# Patient Record
Sex: MALE | Race: WHITE | Employment: FULL TIME | URBAN - NONMETROPOLITAN AREA
[De-identification: names, ages, dates, MRNs, and addresses within clinical notes are randomized per-mention and may not be internally consistent; named-entity substitution may affect disease eponyms.]

---

## 2024-07-10 ENCOUNTER — HOSPITAL ENCOUNTER (EMERGENCY)
Age: 19
Discharge: HOME | End: 2024-07-10
Attending: INTERNAL MEDICINE
Payer: COMMERCIAL

## 2024-07-10 VITALS
TEMPERATURE: 98.1 F | HEIGHT: 72 IN | WEIGHT: 149 LBS | SYSTOLIC BLOOD PRESSURE: 121 MMHG | DIASTOLIC BLOOD PRESSURE: 60 MMHG | HEART RATE: 60 BPM | RESPIRATION RATE: 16 BRPM | OXYGEN SATURATION: 100 % | BODY MASS INDEX: 20.18 KG/M2

## 2024-07-10 DIAGNOSIS — S01.01XA LACERATION OF SCALP, INITIAL ENCOUNTER: Primary | ICD-10-CM

## 2024-07-10 DIAGNOSIS — S09.90XA CLOSED HEAD INJURY, INITIAL ENCOUNTER: ICD-10-CM

## 2024-07-10 PROCEDURE — 6370000000 HC RX 637 (ALT 250 FOR IP): Performed by: INTERNAL MEDICINE

## 2024-07-10 PROCEDURE — 99283 EMERGENCY DEPT VISIT LOW MDM: CPT

## 2024-07-10 PROCEDURE — 12001 RPR S/N/AX/GEN/TRNK 2.5CM/<: CPT

## 2024-07-10 RX ORDER — BACITRACIN ZINC AND POLYMYXIN B SULFATE 500; 1000 [USP'U]/G; [USP'U]/G
OINTMENT TOPICAL ONCE
Status: COMPLETED | OUTPATIENT
Start: 2024-07-10 | End: 2024-07-10

## 2024-07-10 RX ADMIN — Medication 1 G: at 23:35

## 2024-07-10 ASSESSMENT — LIFESTYLE VARIABLES
HOW MANY STANDARD DRINKS CONTAINING ALCOHOL DO YOU HAVE ON A TYPICAL DAY: 1 OR 2
HOW OFTEN DO YOU HAVE A DRINK CONTAINING ALCOHOL: MONTHLY OR LESS

## 2024-07-10 ASSESSMENT — PAIN - FUNCTIONAL ASSESSMENT: PAIN_FUNCTIONAL_ASSESSMENT: 0-10

## 2024-07-10 ASSESSMENT — PAIN SCALES - GENERAL: PAINLEVEL_OUTOF10: 1

## 2024-07-10 ASSESSMENT — PAIN DESCRIPTION - PAIN TYPE: TYPE: ACUTE PAIN

## 2024-07-10 ASSESSMENT — PAIN DESCRIPTION - LOCATION: LOCATION: HEAD

## 2024-07-11 NOTE — ED PROVIDER NOTES
EMERGENCY MEDICINE PROVIDER NOTE    Patient Identification  Pt Name: Yossi Tierney  MRN: [id number]  Birthdate 2005  Date of evaluation: 7/10/2024  Provider: OTIS CHERRY DO  PCP: Elaine Mccain MD    Chief Complaint  Laceration (Patient was trying to kill a groundhog with a rifle when it kicked back and the scope hit him in the head)      HPI  (History provided by patient)  This is a 18 y.o. male who was brought in by self for last patient of the forehead.  Patient was trying to kill a groundhog with a rifle and the rifle kicked back and cut his forehead with a scope.  He denies loss consciousness.  Patient denies double vision blurred vision.  His tetanus shot is up-to-date.  Patient does have a mild headache.  Patient denies neck pain.  He denies any chest pain or shortness of breath.  Patient does not feel as though he is in a pass out.    I have reviewed the following nursing documentation:  Allergies: Zithromax [azithromycin]    Past medical history: No past medical history on file.  Past surgical history: No past surgical history on file.    Home medications:   There are no discharge medications for this patient.      Social history:      Family history:  No family history on file.    Exam  ED Triage Vitals   BP Temp Temp src Pulse Resp SpO2 Height Weight   -- -- -- -- -- -- -- --     Nursing note and vitals reviewed.  General: Patient does not appear to be in acute distress  Head: Atraumatic.  Patient does have a laceration in the center of the forehead that is full-thickness with oozing of blood  ENT: No evidence of angioedema.   Eyes: Anicteric sclera. No discharge.   Neck: Supple. Trachea midline.   Cardiovascular: RRR; heart has a regular rhythm and rate  Pulmonary/Chest: Effort normal. No respiratory distress.  Lungs are clear bilaterally  Musculoskeletal: Pulses are equal bilaterally in the upper and lower extremities.  Neurological: Alert and oriented. Face symmetric. Speech is

## 2024-07-11 NOTE — ED TRIAGE NOTES
Patient was trying to kill a groundhog with a rifle when it kicked back and the scope hit him in the head